# Patient Record
Sex: FEMALE | Race: WHITE | URBAN - METROPOLITAN AREA
[De-identification: names, ages, dates, MRNs, and addresses within clinical notes are randomized per-mention and may not be internally consistent; named-entity substitution may affect disease eponyms.]

---

## 2018-06-22 ENCOUNTER — TELEPHONE (OUTPATIENT)
Dept: FAMILY MEDICINE | Facility: CLINIC | Age: 43
End: 2018-06-22

## 2018-06-22 NOTE — TELEPHONE ENCOUNTER
Reason for Call:  Form, our goal is to have forms completed with 72 hours, however, some forms may require a visit or additional information.    Type of letter, form or note:  FMLA    Who is the form from?: Patient    Where did the form come from: Patient or family brought in       What clinic location was the form placed at?: [facility]    Where the form was placed: 's Box    What number is listed as a contact on the form?: [phone]       Additional comments: This is [facility] forms for her to take care of her Mother- your pt- Latricia Lane- MRN: [id number]. They were in on Wednesday and state you are aware this was coming. Please call Roxanne at [phone] when ready for .     Call taken on 6/22/2018 at 9:16 AM by Delmy Mclaughlin

## 2018-06-25 NOTE — TELEPHONE ENCOUNTER
Forms are completed and were filled for person listed but because of her dad Roberto Lane.  Placed in TC box.  Talon Regalado MD